# Patient Record
Sex: FEMALE | NOT HISPANIC OR LATINO | URBAN - METROPOLITAN AREA
[De-identification: names, ages, dates, MRNs, and addresses within clinical notes are randomized per-mention and may not be internally consistent; named-entity substitution may affect disease eponyms.]

---

## 2018-01-27 ENCOUNTER — EMERGENCY (EMERGENCY)
Facility: HOSPITAL | Age: 58
LOS: 1 days | Discharge: HOME | End: 2018-01-27
Attending: EMERGENCY MEDICINE
Payer: COMMERCIAL

## 2018-01-27 VITALS
RESPIRATION RATE: 18 BRPM | WEIGHT: 136.03 LBS | HEART RATE: 54 BPM | HEIGHT: 63 IN | TEMPERATURE: 98 F | OXYGEN SATURATION: 99 % | DIASTOLIC BLOOD PRESSURE: 92 MMHG | SYSTOLIC BLOOD PRESSURE: 159 MMHG

## 2018-01-27 VITALS
OXYGEN SATURATION: 98 % | SYSTOLIC BLOOD PRESSURE: 150 MMHG | DIASTOLIC BLOOD PRESSURE: 86 MMHG | HEART RATE: 54 BPM | RESPIRATION RATE: 18 BRPM

## 2018-01-27 LAB
ALBUMIN SERPL ELPH-MCNC: 4.2 G/DL — SIGNIFICANT CHANGE UP (ref 3.3–5.2)
ALP SERPL-CCNC: 105 U/L — SIGNIFICANT CHANGE UP (ref 40–120)
ALT FLD-CCNC: 13 U/L — SIGNIFICANT CHANGE UP
ANION GAP SERPL CALC-SCNC: 11 MMOL/L — SIGNIFICANT CHANGE UP (ref 5–17)
APTT BLD: 34.8 SEC — SIGNIFICANT CHANGE UP (ref 27.5–37.4)
AST SERPL-CCNC: 17 U/L — SIGNIFICANT CHANGE UP
BASOPHILS # BLD AUTO: 0 K/UL — SIGNIFICANT CHANGE UP (ref 0–0.2)
BASOPHILS NFR BLD AUTO: 0.6 % — SIGNIFICANT CHANGE UP (ref 0–2)
BILIRUB SERPL-MCNC: 0.5 MG/DL — SIGNIFICANT CHANGE UP (ref 0.4–2)
BUN SERPL-MCNC: 16 MG/DL — SIGNIFICANT CHANGE UP (ref 8–20)
CALCIUM SERPL-MCNC: 9.5 MG/DL — SIGNIFICANT CHANGE UP (ref 8.6–10.2)
CHLORIDE SERPL-SCNC: 107 MMOL/L — SIGNIFICANT CHANGE UP (ref 98–107)
CK SERPL-CCNC: 116 U/L — SIGNIFICANT CHANGE UP (ref 25–170)
CO2 SERPL-SCNC: 26 MMOL/L — SIGNIFICANT CHANGE UP (ref 22–29)
CREAT SERPL-MCNC: 0.8 MG/DL — SIGNIFICANT CHANGE UP (ref 0.5–1.3)
EOSINOPHIL # BLD AUTO: 0.1 K/UL — SIGNIFICANT CHANGE UP (ref 0–0.5)
EOSINOPHIL NFR BLD AUTO: 1.9 % — SIGNIFICANT CHANGE UP (ref 0–6)
GLUCOSE SERPL-MCNC: 91 MG/DL — SIGNIFICANT CHANGE UP (ref 70–115)
HCT VFR BLD CALC: 43.6 % — SIGNIFICANT CHANGE UP (ref 37–47)
HGB BLD-MCNC: 14.6 G/DL — SIGNIFICANT CHANGE UP (ref 12–16)
INR BLD: 1.12 RATIO — SIGNIFICANT CHANGE UP (ref 0.88–1.16)
LYMPHOCYTES # BLD AUTO: 1.4 K/UL — SIGNIFICANT CHANGE UP (ref 1–4.8)
LYMPHOCYTES # BLD AUTO: 22.7 % — SIGNIFICANT CHANGE UP (ref 20–55)
MCHC RBC-ENTMCNC: 30.4 PG — SIGNIFICANT CHANGE UP (ref 27–31)
MCHC RBC-ENTMCNC: 33.5 G/DL — SIGNIFICANT CHANGE UP (ref 32–36)
MCV RBC AUTO: 90.6 FL — SIGNIFICANT CHANGE UP (ref 81–99)
MONOCYTES # BLD AUTO: 0.4 K/UL — SIGNIFICANT CHANGE UP (ref 0–0.8)
MONOCYTES NFR BLD AUTO: 6 % — SIGNIFICANT CHANGE UP (ref 3–10)
NEUTROPHILS # BLD AUTO: 4.2 K/UL — SIGNIFICANT CHANGE UP (ref 1.8–8)
NEUTROPHILS NFR BLD AUTO: 68.6 % — SIGNIFICANT CHANGE UP (ref 37–73)
NT-PROBNP SERPL-SCNC: 76 PG/ML — SIGNIFICANT CHANGE UP (ref 0–300)
PLATELET # BLD AUTO: 208 K/UL — SIGNIFICANT CHANGE UP (ref 150–400)
POTASSIUM SERPL-MCNC: 3.8 MMOL/L — SIGNIFICANT CHANGE UP (ref 3.5–5.3)
POTASSIUM SERPL-SCNC: 3.8 MMOL/L — SIGNIFICANT CHANGE UP (ref 3.5–5.3)
PROT SERPL-MCNC: 7.1 G/DL — SIGNIFICANT CHANGE UP (ref 6.6–8.7)
PROTHROM AB SERPL-ACNC: 12.4 SEC — SIGNIFICANT CHANGE UP (ref 9.8–12.7)
RBC # BLD: 4.81 M/UL — SIGNIFICANT CHANGE UP (ref 4.4–5.2)
RBC # FLD: 13 % — SIGNIFICANT CHANGE UP (ref 11–15.6)
SODIUM SERPL-SCNC: 144 MMOL/L — SIGNIFICANT CHANGE UP (ref 135–145)
TROPONIN T SERPL-MCNC: <0.01 NG/ML — SIGNIFICANT CHANGE UP (ref 0–0.06)
TROPONIN T SERPL-MCNC: <0.01 NG/ML — SIGNIFICANT CHANGE UP (ref 0–0.06)
WBC # BLD: 6.2 K/UL — SIGNIFICANT CHANGE UP (ref 4.8–10.8)
WBC # FLD AUTO: 6.2 K/UL — SIGNIFICANT CHANGE UP (ref 4.8–10.8)

## 2018-01-27 PROCEDURE — 83880 ASSAY OF NATRIURETIC PEPTIDE: CPT

## 2018-01-27 PROCEDURE — 71045 X-RAY EXAM CHEST 1 VIEW: CPT

## 2018-01-27 PROCEDURE — 85730 THROMBOPLASTIN TIME PARTIAL: CPT

## 2018-01-27 PROCEDURE — 85610 PROTHROMBIN TIME: CPT

## 2018-01-27 PROCEDURE — 99285 EMERGENCY DEPT VISIT HI MDM: CPT

## 2018-01-27 PROCEDURE — 93005 ELECTROCARDIOGRAM TRACING: CPT

## 2018-01-27 PROCEDURE — 71045 X-RAY EXAM CHEST 1 VIEW: CPT | Mod: 26

## 2018-01-27 PROCEDURE — 85379 FIBRIN DEGRADATION QUANT: CPT

## 2018-01-27 PROCEDURE — 70450 CT HEAD/BRAIN W/O DYE: CPT

## 2018-01-27 PROCEDURE — 93010 ELECTROCARDIOGRAM REPORT: CPT

## 2018-01-27 PROCEDURE — 85027 COMPLETE CBC AUTOMATED: CPT

## 2018-01-27 PROCEDURE — 99284 EMERGENCY DEPT VISIT MOD MDM: CPT | Mod: 25

## 2018-01-27 PROCEDURE — 70450 CT HEAD/BRAIN W/O DYE: CPT | Mod: 26

## 2018-01-27 PROCEDURE — 80053 COMPREHEN METABOLIC PANEL: CPT

## 2018-01-27 PROCEDURE — 82550 ASSAY OF CK (CPK): CPT

## 2018-01-27 PROCEDURE — 36415 COLL VENOUS BLD VENIPUNCTURE: CPT

## 2018-01-27 PROCEDURE — 84484 ASSAY OF TROPONIN QUANT: CPT

## 2018-01-27 RX ORDER — PANTOPRAZOLE SODIUM 20 MG/1
1 TABLET, DELAYED RELEASE ORAL
Qty: 0 | Refills: 0 | COMMUNITY

## 2018-01-27 RX ORDER — NEBIVOLOL HYDROCHLORIDE 5 MG/1
1 TABLET ORAL
Qty: 0 | Refills: 0 | COMMUNITY

## 2018-01-27 RX ORDER — ZOLPIDEM TARTRATE 10 MG/1
1 TABLET ORAL
Qty: 0 | Refills: 0 | COMMUNITY

## 2018-01-27 RX ORDER — ALPRAZOLAM 0.25 MG
0 TABLET ORAL
Qty: 0 | Refills: 0 | COMMUNITY

## 2018-01-27 RX ORDER — NITROGLYCERIN 6.5 MG
1 CAPSULE, EXTENDED RELEASE ORAL ONCE
Qty: 0 | Refills: 0 | Status: COMPLETED | OUTPATIENT
Start: 2018-01-27 | End: 2018-01-27

## 2018-01-27 RX ORDER — ASPIRIN/CALCIUM CARB/MAGNESIUM 324 MG
325 TABLET ORAL ONCE
Qty: 0 | Refills: 0 | Status: COMPLETED | OUTPATIENT
Start: 2018-01-27 | End: 2018-01-27

## 2018-01-27 RX ADMIN — Medication 325 MILLIGRAM(S): at 11:59

## 2018-01-27 NOTE — ED ADULT NURSE NOTE - OBJECTIVE STATEMENT
LAte entry: Pt reports to ED c/o mid sternal chest pain radiating to L arm tingling, + SOB  started yesterday, Hx of HTN

## 2018-01-27 NOTE — ED PROVIDER NOTE - OBJECTIVE STATEMENT
56 y/o F pt presents to ED c/o of substernal chest pressure X 1 day SOB, CP with LUE pain. She additionally reports mild HA for several weeks. She denies N V/D fever or chills or diaphoresis. Pt is a former smoker; occasional ETOH. No further complaints at this time.

## 2018-01-27 NOTE — CONSULT NOTE ADULT - ASSESSMENT
58 y/o F with h/o BCA s/p b/l mastectomy HTN on Bystolic with episodic sscp.  patient reports similar symptoms 6 months ago at which time she had an ecg;echo and stress test by her Cardiologist and reported normal studies also nomral cholesterol. Patient reprots remote hx of smoking.   She now presents with sscp that awoke her form sleep at 2am .. The pain was associated with radiation to left arm and spontaneous resolved.  In ED hemodynamically stable cp free, ECG wnl initial tni (-); symmetrical bp, nomral sao2 at rest.      Imp:  1: SSCP: atypical for CAD low pre-test prob for CAD:? musculoskeletal vs GERD  given recent nl Echo/Stress : recc: check tni x 2; serial ecg if remaisn cp free and tni(-) x 2; BP remains optimized can follow up with out pt cardiologist for further w/up ? coronary CTA  2: Essential HTN: TAHIR stable continue Bystolic  3: Bradycardia: SB tolerating Bystolic if HR persistently low can change BB to Norvasc.  D/w Dr Chen  patient advised if she has recurrent sxs she should seek medical care ASAP

## 2018-01-27 NOTE — CONSULT NOTE ADULT - SUBJECTIVE AND OBJECTIVE BOX
[facility]/EP                                                        [facility]                                                             Office: [address] [address]                                                            Telephone: [phone]. Fax:[phone]            Patient is a 57y old  Female who presents with a chief complaint of sscp    HPI:   58 y/o F with h/o BCA s/p b/l mastectomy with reconstruction 2011;  HTN on Bystolic with episodic sscp.  patient reports similar symptoms 6 months ago at which time she had an ecg;echo and stress test by her Cardiologist and reported normal studies  She now presents with sscp that awoke her form sleep at 2am .. The pain was associated with radiation to left arm and spontaneous resolved.  In ED hemodynamically stable cp free, ECG wnl initial tni (-); D-Dimer (-). Patient  reports increase stressors at this time          Primary Complaint: SSCP        Severity : 6/10  Radiation ;  LT shoulder  Associated factors (-) triggers such as caffeine etoh, exercise, stress  Onset: abrupt  Duration seconds to 5 minutes ; No episodes > 1 hour  previous cardiac testing : Patient reports (-) stress test and echo 6 months ago.      Patient denies orthopnea PND,LE edema , syncope or pre-syncope  Functional status > 10 mets  No limitations with AODL  Co-morbid: (-) DM, (_) CVA, (_) COPD (-) PE (-) DVT (-) Bleeding or clotting disorders  ECG: SRwith ns st twa  no phenotypic evidence of Brs, HCM ,LQTS    PAST MEDICAL & SURGICAL HISTORY:  GERD (gastroesophageal reflux disease)  Anxiety  HTN (hypertension)      PREVIOUS DIAGNOSTIC TESTING:          Allergies    ciprofloxacin (Unknown)  iodine (Other)    Intolerances        MEDICATIONS  (STANDING):  nitroglycerin    2% Ointment 1 Inch(s) Transdermal Once    MEDICATIONS  (PRN):      FAMILY HISTORY:      SOCIAL HISTORY:Lives with family    CIGARETTES:None    ALCOHOL:None    REVIEW OF SYSTEMS:  CONSTITUTIONAL: No fever, weight loss, or fatigue  EYES: No eye pain, visual disturbances, or discharge  ENMT:  No difficulty hearing, tinnitus, vertigo; No sinus or throat pain  NECK: No pain or stiffness  RESPIRATORY: No cough, wheezing, chills or hemoptysis; No Shortness of Breath  CARDIOVASCULAR: No chest pain, palpitations, passing out, dizziness, or leg swelling  GASTROINTESTINAL: No abdominal or epigastric pain. No nausea, vomiting, or hematemesis; No diarrhea or constipation. No melena or hematochezia.  GENITOURINARY: No dysuria, frequency, hematuria, or incontinence  NEUROLOGICAL: No headaches, memory loss, loss of strength, numbness, or tremors  SKIN: No itching, burning, rashes, or lesions   LYMPH Nodes: No enlarged glands  ENDOCRINE: No heat or cold intolerance; No hair loss  MUSCULOSKELETAL: No joint pain or swelling; No muscle, back, or extremity pain  PSYCHIATRIC: No depression, anxiety, mood swings, or difficulty sleeping  HEME/LYMPH: No easy bruising, or bleeding gums  ALLERY AND IMMUNOLOGIC: No hives or eczema	    Vital Signs Last 24 Hrs  T(C): 36.7 (27 Jan 2018 09:56), Max: 36.7 (27 Jan 2018 09:56)  T(F): 98 (27 Jan 2018 09:56), Max: 98 (27 Jan 2018 09:56)  HR: 48 (27 Jan 2018 11:20) (48 - 54)  BP: 159/92 (27 Jan 2018 09:56) (159/92 - 159/92)  BP(mean): --  RR: 18 (27 Jan 2018 09:56) (18 - 18)  SpO2: 99% (27 Jan 2018 09:56) (99% - 99%)    Daily Height in cm: 167.64 (27 Jan 2018 11:20)    Daily     I&O's Detail      PHYSICAL EXAM:  Appearance: Normal, well nourished	  HEENT:   Normal oral mucosa, PERRL, EOMI, sclera non-icteric	  Lymphatic: No cervical lymphadenopathy  Cardiovascular: Normal S1 S2, No JVD, No cardiac murmurs, No carotid bruits, No peripheral edema  Respiratory: Lungs clear to auscultation	  Psychiatry: A & O x 3, Mood & affect appropriate  Gastrointestinal:  Soft, Non-tender, + BS, no bruits	  Skin: No rashes, No ecchymoses, No cyanosis  Neurologic: Grossly non-focal with full strength in all four extremities  Extremities: Normal range of motion, No clubbing, cyanosis or edema  Vascular: Peripheral pulses palpable 2+ bilaterally      INTERPRETATION OF TELEMETRY:    ECG:    LABS:                        14.6   6.2   )-----------( 208      ( 27 Jan 2018 11:15 )             43.6     01-27    144  |  107  |  16.0  ----------------------------<  91  3.8   |  26.0  |  0.80    Ca    9.5      27 Jan 2018 11:15    TPro  7.1  /  Alb  4.2  /  TBili  0.5  /  DBili  x   /  AST  17  /  ALT  13  /  AlkPhos  105  01-27      CARDIAC MARKERS ( 27 Jan 2018 11:15 )   U/L / CKMBx     / Troponin T<0.01 ng/mL /      PT/INR - ( 27 Jan 2018 11:15 )   PT: 12.4 sec;   INR: 1.12 ratio         PTT - ( 27 Jan 2018 11:15 )  PTT:34.8 sec    I&O's Summary    BNP  RADIOLOGY & ADDITIONAL STUDIES:

## 2018-01-28 ENCOUNTER — TRANSCRIPTION ENCOUNTER (OUTPATIENT)
Age: 58
End: 2018-01-28

## 2018-11-28 NOTE — ED PROVIDER NOTE - NS ED MD DISPO DISCHARGE
Home
[facility] [phone] (M-F 9am-7pm)   [facility] [phone]  Consider inpatient vs outpatient rehabs

## 2019-10-04 NOTE — ED ADULT NURSE NOTE - ALCOHOL PRE SCREEN (AUDIT - C)
Digital Medicine: Clinician Follow-Up    Per 30 day average, 134/78 mmHg, patient's BP is not at goal.      Ms. Angela is doing well. She recently had a cold and received steroid injection and antibiotics. She has completed course.         Follow Up  Follow-up reason(s): reading review          Assessment/Plan        BP was elevated recently due to steroid injection. Most recent BP has started to improve. No changes required at this time.     Will continue to monitor regularly.     Asked patient to call or message with questions or concerns.           Last 5 Patient Entered Readings                                      Current 30 Day Average: 134/78     Recent Readings 10/1/2019 9/30/2019 9/30/2019 9/28/2019 9/26/2019    SBP (mmHg) 128 152 142 112 139    DBP (mmHg) 67 82 84 82 68    Pulse 72 78 78 85 82             Hypertension Medications             amLODIPine (NORVASC) 10 MG tablet TAKE 1 TABLET BY MOUTH EVERY DAY    losartan (COZAAR) 50 MG tablet TAKE 1 TABLET BY MOUTH EVERY DAY    metoprolol succinate (TOPROL-XL) 50 MG 24 hr tablet TAKE 1 TABLET BY MOUTH EVERY DAY               
Statement Selected

## 2020-03-16 ENCOUNTER — TRANSCRIPTION ENCOUNTER (OUTPATIENT)
Age: 60
End: 2020-03-16

## 2023-08-21 NOTE — ED ADULT NURSE NOTE - CADM POA URETHRAL CATHETER
No Quality 110: Preventive Care And Screening: Influenza Immunization: Influenza Immunization Administered during Influenza season Quality 431: Preventive Care And Screening: Unhealthy Alcohol Use - Screening: Patient not identified as an unhealthy alcohol user when screened for unhealthy alcohol use using a systematic screening method Quality 111:Pneumonia Vaccination Status For Older Adults: Patient received any pneumococcal conjugate or polysaccharide vaccine on or after their 60th birthday and before the end of the measurement period Quality 226: Preventive Care And Screening: Tobacco Use: Screening And Cessation Intervention: Patient screened for tobacco use and is an ex/non-smoker Detail Level: Detailed

## 2023-09-20 NOTE — ED ADULT NURSE NOTE - BSA (M2)
1137 Pt arrived from CCL, right radial site clean dry and intact, no hematoma. Vasc band @ 14cc. 1410 Vasc band off. 10cc of air removed from band. Site clean dry and intact no hematoma. 36 Pt ambulated in the hallway with RN. No c/o chest pain or SOB. 1.69